# Patient Record
Sex: FEMALE | URBAN - NONMETROPOLITAN AREA
[De-identification: names, ages, dates, MRNs, and addresses within clinical notes are randomized per-mention and may not be internally consistent; named-entity substitution may affect disease eponyms.]

---

## 2022-01-04 ENCOUNTER — APPOINTMENT (RX ONLY)
Dept: URBAN - METROPOLITAN AREA CLINIC 16 | Facility: CLINIC | Age: 47
Setting detail: DERMATOLOGY
End: 2022-01-04

## 2022-01-04 VITALS — WEIGHT: 134 LBS | HEIGHT: 62 IN

## 2022-01-04 DIAGNOSIS — L81.4 OTHER MELANIN HYPERPIGMENTATION: ICD-10-CM | Status: STABLE

## 2022-01-04 DIAGNOSIS — D22 MELANOCYTIC NEVI: ICD-10-CM | Status: STABLE

## 2022-01-04 DIAGNOSIS — L73.8 OTHER SPECIFIED FOLLICULAR DISORDERS: ICD-10-CM | Status: STABLE

## 2022-01-04 DIAGNOSIS — D18.0 HEMANGIOMA: ICD-10-CM | Status: STABLE

## 2022-01-04 DIAGNOSIS — L82.1 OTHER SEBORRHEIC KERATOSIS: ICD-10-CM | Status: STABLE

## 2022-01-04 PROBLEM — D18.01 HEMANGIOMA OF SKIN AND SUBCUTANEOUS TISSUE: Status: ACTIVE | Noted: 2022-01-04

## 2022-01-04 PROBLEM — D22.0 MELANOCYTIC NEVI OF LIP: Status: ACTIVE | Noted: 2022-01-04

## 2022-01-04 PROBLEM — D48.5 NEOPLASM OF UNCERTAIN BEHAVIOR OF SKIN: Status: ACTIVE | Noted: 2022-01-04

## 2022-01-04 PROBLEM — D22.39 MELANOCYTIC NEVI OF OTHER PARTS OF FACE: Status: ACTIVE | Noted: 2022-01-04

## 2022-01-04 PROBLEM — D23.72 OTHER BENIGN NEOPLASM OF SKIN OF LEFT LOWER LIMB, INCLUDING HIP: Status: ACTIVE | Noted: 2022-01-04

## 2022-01-04 PROCEDURE — ? TREATMENT REGIMEN

## 2022-01-04 PROCEDURE — ? BIOPSY BY SHAVE METHOD

## 2022-01-04 PROCEDURE — 99203 OFFICE O/P NEW LOW 30 MIN: CPT | Mod: 25

## 2022-01-04 PROCEDURE — ? COUNSELING

## 2022-01-04 PROCEDURE — 11102 TANGNTL BX SKIN SINGLE LES: CPT

## 2022-01-04 PROCEDURE — ? EDUCATIONAL RESOURCES PROVIDED

## 2022-01-04 ASSESSMENT — LOCATION DETAILED DESCRIPTION DERM
LOCATION DETAILED: LEFT UPPER CUTANEOUS LIP
LOCATION DETAILED: LEFT INFERIOR MEDIAL MALAR CHEEK
LOCATION DETAILED: RIGHT SUPERIOR LATERAL UPPER BACK
LOCATION DETAILED: RIGHT MEDIAL MALAR CHEEK
LOCATION DETAILED: LEFT CENTRAL MALAR CHEEK
LOCATION DETAILED: RIGHT PROXIMAL CALF
LOCATION DETAILED: SUPERIOR THORACIC SPINE
LOCATION DETAILED: LEFT POSTERIOR SHOULDER
LOCATION DETAILED: LEFT INFERIOR LATERAL MALAR CHEEK

## 2022-01-04 ASSESSMENT — LOCATION SIMPLE DESCRIPTION DERM
LOCATION SIMPLE: RIGHT BACK
LOCATION SIMPLE: RIGHT CALF
LOCATION SIMPLE: RIGHT CHEEK
LOCATION SIMPLE: LEFT LIP
LOCATION SIMPLE: LEFT SHOULDER
LOCATION SIMPLE: UPPER BACK
LOCATION SIMPLE: LEFT CHEEK

## 2022-01-04 ASSESSMENT — LOCATION ZONE DERM
LOCATION ZONE: ARM
LOCATION ZONE: FACE
LOCATION ZONE: TRUNK
LOCATION ZONE: LEG
LOCATION ZONE: LIP

## 2022-01-04 NOTE — PROCEDURE: MIPS QUALITY
Quality 265: Biopsy Follow-Up: Biopsy results reviewed, communicated, tracked, and documented
Quality 110: Preventive Care And Screening: Influenza Immunization: Influenza immunization was not ordered or administered, reason not given
Quality 402: Tobacco Use And Help With Quitting Among Adolescents: Patient screened for tobacco and never smoked
Quality 130: Documentation Of Current Medications In The Medical Record: Current Medications Documented
Detail Level: Detailed
Quality 111:Pneumonia Vaccination Status For Older Adults: Pneumococcal Vaccination not Administered or Previously Received, Reason not Otherwise Specified
Quality 431: Preventive Care And Screening: Unhealthy Alcohol Use - Screening: Patient not identified as an unhealthy alcohol user when screened for unhealthy alcohol use using a systematic screening method
Quality 128: Preventive Care And Screening: Body Mass Index (Bmi) Screening And Follow-Up Plan: BMI is documented within normal parameters and no follow-up plan is required.

## 2022-01-04 NOTE — PROCEDURE: BIOPSY BY SHAVE METHOD
Validate That Anesthesia Is Not 0: No
Electrodesiccation Text: The wound bed was treated with electrodesiccation after the biopsy was performed.
Anesthesia Volume In Cc: 0.5
Detail Level: Detailed
Hemostasis: Electrocautery
Notification Instructions: Patient will be notified of biopsy results. However, patient instructed to call the office if not contacted within 2 weeks.
Consent: Written consent was obtained and risks were reviewed including but not limited to scarring, infection, bleeding, scabbing, incomplete removal, nerve damage and allergy to anesthesia.
Type Of Destruction Used: Curettage
Post-Care Instructions: I reviewed with the patient in detail post-care instructions. Patient is to keep the biopsy site dry overnight, and then apply bacitracin twice daily until healed. Patient may apply hydrogen peroxide soaks to remove any crusting.
Depth Of Biopsy: dermis
Wound Care: Petrolatum
Was A Bandage Applied: Yes
X Size Of Lesion In Cm: 0
Biopsy Type: H and E
Information: Selecting Yes will display possible errors in your note based on the variables you have selected. This validation is only offered as a suggestion for you. PLEASE NOTE THAT THE VALIDATION TEXT WILL BE REMOVED WHEN YOU FINALIZE YOUR NOTE. IF YOU WANT TO FAX A PRELIMINARY NOTE YOU WILL NEED TO TOGGLE THIS TO 'NO' IF YOU DO NOT WANT IT IN YOUR FAXED NOTE.
Cryotherapy Text: The wound bed was treated with cryotherapy after the biopsy was performed.
Dressing: bandage
Biopsy Method: Dermablade
Silver Nitrate Text: The wound bed was treated with silver nitrate after the biopsy was performed.
Curettage Text: The wound bed was treated with curettage after the biopsy was performed.
Anesthesia Type: 1% lidocaine with epinephrine
Electrodesiccation And Curettage Text: The wound bed was treated with electrodesiccation and curettage after the biopsy was performed.
Billing Type: Third-Party Bill

## 2022-01-15 ENCOUNTER — APPOINTMENT (RX ONLY)
Dept: URBAN - METROPOLITAN AREA CLINIC 16 | Facility: CLINIC | Age: 47
Setting detail: DERMATOLOGY
End: 2022-01-15

## 2022-01-15 VITALS — HEIGHT: 62 IN | WEIGHT: 131 LBS

## 2022-01-15 DIAGNOSIS — D22 MELANOCYTIC NEVI: ICD-10-CM

## 2022-01-15 PROBLEM — D22.71 MELANOCYTIC NEVI OF RIGHT LOWER LIMB, INCLUDING HIP: Status: ACTIVE | Noted: 2022-01-15

## 2022-01-15 PROCEDURE — ? EXCISION

## 2022-01-15 PROCEDURE — 11403 EXC TR-EXT B9+MARG 2.1-3CM: CPT

## 2022-01-15 PROCEDURE — 13121 CMPLX RPR S/A/L 2.6-7.5 CM: CPT

## 2022-01-15 ASSESSMENT — LOCATION DETAILED DESCRIPTION DERM: LOCATION DETAILED: RIGHT PROXIMAL CALF

## 2022-01-15 ASSESSMENT — LOCATION ZONE DERM: LOCATION ZONE: LEG

## 2022-01-15 ASSESSMENT — LOCATION SIMPLE DESCRIPTION DERM: LOCATION SIMPLE: RIGHT CALF

## 2022-01-15 NOTE — PROCEDURE: MIPS QUALITY
Quality 111:Pneumonia Vaccination Status For Older Adults: Pneumococcal Vaccination not Administered or Previously Received, Reason not Otherwise Specified
Quality 265: Biopsy Follow-Up: Biopsy results reviewed, communicated, tracked, and documented
Quality 431: Preventive Care And Screening: Unhealthy Alcohol Use - Screening: Patient not identified as an unhealthy alcohol user when screened for unhealthy alcohol use using a systematic screening method
Quality 128: Preventive Care And Screening: Body Mass Index (Bmi) Screening And Follow-Up Plan: BMI is documented within normal parameters and no follow-up plan is required.
Quality 130: Documentation Of Current Medications In The Medical Record: Current Medications Documented
Quality 402: Tobacco Use And Help With Quitting Among Adolescents: Patient screened for tobacco and never smoked
Detail Level: Detailed
Quality 110: Preventive Care And Screening: Influenza Immunization: Influenza immunization was not ordered or administered, reason not given

## 2022-01-15 NOTE — PROCEDURE: EXCISION
Show Accession Variable: Yes
Lazy S Intermediate Repair Preamble Text (Leave Blank If You Do Not Want): Undermining was performed with blunt dissection.
Purse String (Intermediate) Text: Given the location of the defect and the characteristics of the surrounding skin a pursestring intermediate closure was deemed most appropriate.  Undermining was performed circumfirentially around the surgical defect.  A purstring suture was then placed and tightened.
O-L Flap Text: The defect edges were debeveled with a #15 scalpel blade.  Given the location of the defect, shape of the defect and the proximity to free margins an O-L flap was deemed most appropriate.  Using a sterile surgical marker, an appropriate advancement flap was drawn incorporating the defect and placing the expected incisions within the relaxed skin tension lines where possible.    The area thus outlined was incised deep to adipose tissue with a #15 scalpel blade.  The skin margins were undermined to an appropriate distance in all directions utilizing iris scissors.
Hemigard Intro: Due to skin fragility and wound tension, it was decided to use HEMIGARD adhesive retention suture devices to permit a linear closure. The skin was cleaned and dried for a 6cm distance away from the wound. Excessive hair, if present, was removed to allow for adhesion.
Ear Star Wedge Flap Text: The defect edges were debeveled with a #15 blade scalpel.  Given the location of the defect and the proximity to free margins (helical rim) an ear star wedge flap was deemed most appropriate.  Using a sterile surgical marker, the appropriate flap was drawn incorporating the defect and placing the expected incisions between the helical rim and antihelix where possible.  The area thus outlined was incised through and through with a #15 scalpel blade.
Cheek Interpolation Flap Text: A decision was made to reconstruct the defect utilizing an interpolation axial flap and a staged reconstruction.  A telfa template was made of the defect.  This telfa template was then used to outline the Cheek Interpolation flap.  The donor area for the pedicle flap was then injected with anesthesia.  The flap was excised through the skin and subcutaneous tissue down to the layer of the underlying musculature.  The interpolation flap was carefully excised within this deep plane to maintain its blood supply.  The edges of the donor site were undermined.   The donor site was closed in a primary fashion.  The pedicle was then rotated into position and sutured.  Once the tube was sutured into place, adequate blood supply was confirmed with blanching and refill.  The pedicle was then wrapped with xeroform gauze and dressed appropriately with a telfa and gauze bandage to ensure continued blood supply and protect the attached pedicle.
Skin Substitute Units (Will Override Primary Defect Units If Greater Than 0): 0
Complex Repair And Burow's Graft Text: The defect edges were debeveled with a #15 scalpel blade.  The primary defect was closed partially with a complex linear closure.  Given the location of the defect, shape of the defect, the proximity to free margins and the presence of a standing cone deformity a Burow's graft was deemed most appropriate to repair the remaining defect.  The graft was trimmed to fit the size of the remaining defect.  The graft was then placed in the primary defect, oriented appropriately, and sutured into place.
Cartilage Graft Text: The defect edges were debeveled with a #15 scalpel blade.  Given the location of the defect, shape of the defect, the fact the defect involved a full thickness cartilage defect a cartilage graft was deemed most appropriate.  An appropriate donor site was identified, cleansed, and anesthetized. The cartilage graft was then harvested and transferred to the recipient site, oriented appropriately and then sutured into place.  The secondary defect was then repaired using a primary closure.
Anesthesia Type: 1% lidocaine with epinephrine
Complex Repair Preamble Text (Leave Blank If You Do Not Want): Extensive wide undermining was performed.
Double O-Z Plasty Text: The defect edges were debeveled with a #15 scalpel blade.  Given the location of the defect, shape of the defect and the proximity to free margins a Double O-Z plasty (double transposition flap) was deemed most appropriate.  Using a sterile surgical marker, the appropriate transposition flaps were drawn incorporating the defect and placing the expected incisions within the relaxed skin tension lines where possible. The area thus outlined was incised deep to adipose tissue with a #15 scalpel blade.  The skin margins were undermined to an appropriate distance in all directions utilizing iris scissors.  Hemostasis was achieved with electrocautery.  The flaps were then transposed into place, one clockwise and the other counterclockwise, and anchored with interrupted buried subcutaneous sutures.
Transposition Flap Text: The defect edges were debeveled with a #15 scalpel blade.  Given the location of the defect and the proximity to free margins a transposition flap was deemed most appropriate.  Using a sterile surgical marker, an appropriate transposition flap was drawn incorporating the defect.    The area thus outlined was incised deep to adipose tissue with a #15 scalpel blade.  The skin margins were undermined to an appropriate distance in all directions utilizing iris scissors.
Anesthesia Volume In Cc: 3
Complex Requirements: Retention Sutures?: No
Excision Method: Elliptical
Detail Level: Detailed
Excisional Biopsy Additional Text (Leave Blank If You Do Not Want): The margin was drawn around the clinically apparent lesion. An elliptical shape was then drawn on the skin incorporating the lesion and margins.  Incisions were then made along these lines to the appropriate tissue plane and the lesion was extirpated.
Complex Repair And O-L Flap Text: The defect edges were debeveled with a #15 scalpel blade.  The primary defect was closed partially with a complex linear closure.  Given the location of the remaining defect, shape of the defect and the proximity to free margins an O-L flap was deemed most appropriate for complete closure of the defect.  Using a sterile surgical marker, an appropriate flap was drawn incorporating the defect and placing the expected incisions within the relaxed skin tension lines where possible.    The area thus outlined was incised deep to adipose tissue with a #15 scalpel blade.  The skin margins were undermined to an appropriate distance in all directions utilizing iris scissors.
Mucosal Advancement Flap Text: Given the location of the defect, shape of the defect and the proximity to free margins a mucosal advancement flap was deemed most appropriate. Incisions were made with a 15 blade scalpel in the appropriate fashion along the cutaneous vermillion border and the mucosal lip. The remaining actinically damaged mucosal tissue was excised.  The mucosal advancement flap was then elevated to the gingival sulcus with care taken to preserve the neurovascular structures and advanced into the primary defect. Care was taken to ensure that precise realignment of the vermilion border was achieved.
Island Pedicle Flap With Canthal Suspension Text: The defect edges were debeveled with a #15 scalpel blade.  Given the location of the defect, shape of the defect and the proximity to free margins an island pedicle advancement flap was deemed most appropriate.  Using a sterile surgical marker, an appropriate advancement flap was drawn incorporating the defect, outlining the appropriate donor tissue and placing the expected incisions within the relaxed skin tension lines where possible. The area thus outlined was incised deep to adipose tissue with a #15 scalpel blade.  The skin margins were undermined to an appropriate distance in all directions around the primary defect and laterally outward around the island pedicle utilizing iris scissors.  There was minimal undermining beneath the pedicle flap. A suspension suture was placed in the canthal tendon to prevent tension and prevent ectropion.
Lip Wedge Excision Repair Text: Given the location of the defect and the proximity to free margins a full thickness wedge repair was deemed most appropriate.  Using a sterile surgical marker, the appropriate repair was drawn incorporating the defect and placing the expected incisions perpendicular to the vermilion border.  The vermilion border was also meticulously outlined to ensure appropriate reapproximation during the repair.  The area thus outlined was incised through and through with a #15 scalpel blade.  The muscularis and dermis were reaproximated with deep sutures following hemostasis. Care was taken to realign the vermilion border before proceeding with the superficial closure.  Once the vermilion was realigned the superfical and mucosal closure was finished.
Complex Repair And O-T Advancement Flap Text: The defect edges were debeveled with a #15 scalpel blade.  The primary defect was closed partially with a complex linear closure.  Given the location of the remaining defect, shape of the defect and the proximity to free margins an O-T advancement flap was deemed most appropriate for complete closure of the defect.  Using a sterile surgical marker, an appropriate advancement flap was drawn incorporating the defect and placing the expected incisions within the relaxed skin tension lines where possible.    The area thus outlined was incised deep to adipose tissue with a #15 scalpel blade.  The skin margins were undermined to an appropriate distance in all directions utilizing iris scissors.
Complex Repair And Skin Substitute Graft Text: The defect edges were debeveled with a #15 scalpel blade.  The primary defect was closed partially with a complex linear closure.  Given the location of the remaining defect, shape of the defect and the proximity to free margins a skin substitute graft was deemed most appropriate to repair the remaining defect.  The graft was trimmed to fit the size of the remaining defect.  The graft was then placed in the primary defect, oriented appropriately, and sutured into place.
Modified Advancement Flap Text: The defect edges were debeveled with a #15 scalpel blade.  Given the location of the defect, shape of the defect and the proximity to free margins a modified advancement flap was deemed most appropriate.  Using a sterile surgical marker, an appropriate advancement flap was drawn incorporating the defect and placing the expected incisions within the relaxed skin tension lines where possible.    The area thus outlined was incised deep to adipose tissue with a #15 scalpel blade.  The skin margins were undermined to an appropriate distance in all directions utilizing iris scissors.
Island Pedicle Flap Text: The defect edges were debeveled with a #15 scalpel blade.  Given the location of the defect, shape of the defect and the proximity to free margins an island pedicle advancement flap was deemed most appropriate.  Using a sterile surgical marker, an appropriate advancement flap was drawn incorporating the defect, outlining the appropriate donor tissue and placing the expected incisions within the relaxed skin tension lines where possible.    The area thus outlined was incised deep to adipose tissue with a #15 scalpel blade.  The skin margins were undermined to an appropriate distance in all directions around the primary defect and laterally outward around the island pedicle utilizing iris scissors.  There was minimal undermining beneath the pedicle flap.
Paramedian Forehead Flap Text: A decision was made to reconstruct the defect utilizing an interpolation axial flap and a staged reconstruction.  A telfa template was made of the defect.  This telfa template was then used to outline the paramedian forehead pedicle flap.  The donor area for the pedicle flap was then injected with anesthesia.  The flap was excised through the skin and subcutaneous tissue down to the layer of the underlying musculature.  The pedicle flap was carefully excised within this deep plane to maintain its blood supply.  The edges of the donor site were undermined.   The donor site was closed in a primary fashion.  The pedicle was then rotated into position and sutured.  Once the tube was sutured into place, adequate blood supply was confirmed with blanching and refill.  The pedicle was then wrapped with xeroform gauze and dressed appropriately with a telfa and gauze bandage to ensure continued blood supply and protect the attached pedicle.
Xenograft Text: The defect edges were debeveled with a #15 scalpel blade.  Given the location of the defect, shape of the defect and the proximity to free margins a xenograft was deemed most appropriate.  The graft was then trimmed to fit the size of the defect.  The graft was then placed in the primary defect and oriented appropriately.
Suturegard Body: The suture ends were repeatedly re-tightened and re-clamped to achieve the desired tissue expansion.
O-T Advancement Flap Text: The defect edges were debeveled with a #15 scalpel blade.  Given the location of the defect, shape of the defect and the proximity to free margins an O-T advancement flap was deemed most appropriate.  Using a sterile surgical marker, an appropriate advancement flap was drawn incorporating the defect and placing the expected incisions within the relaxed skin tension lines where possible.    The area thus outlined was incised deep to adipose tissue with a #15 scalpel blade.  The skin margins were undermined to an appropriate distance in all directions utilizing iris scissors.
Bilateral Helical Rim Advancement Flap Text: The defect edges were debeveled with a #15 blade scalpel.  Given the location of the defect and the proximity to free margins (helical rim) a bilateral helical rim advancement flap was deemed most appropriate.  Using a sterile surgical marker, the appropriate advancement flaps were drawn incorporating the defect and placing the expected incisions between the helical rim and antihelix where possible.  The area thus outlined was incised through and through with a #15 scalpel blade.  With a skin hook and iris scissors, the flaps were gently and sharply undermined and freed up.
Zygomaticofacial Flap Text: Given the location of the defect, shape of the defect and the proximity to free margins a zygomaticofacial flap was deemed most appropriate for repair.  Using a sterile surgical marker, the appropriate flap was drawn incorporating the defect and placing the expected incisions within the relaxed skin tension lines where possible. The area thus outlined was incised deep to adipose tissue with a #15 scalpel blade with preservation of a vascular pedicle.  The skin margins were undermined to an appropriate distance in all directions utilizing iris scissors.  The flap was then placed into the defect and anchored with interrupted buried subcutaneous sutures.
Advancement Flap (Single) Text: The defect edges were debeveled with a #15 scalpel blade.  Given the location of the defect and the proximity to free margins a single advancement flap was deemed most appropriate.  Using a sterile surgical marker, an appropriate advancement flap was drawn incorporating the defect and placing the expected incisions within the relaxed skin tension lines where possible.    The area thus outlined was incised deep to adipose tissue with a #15 scalpel blade.  The skin margins were undermined to an appropriate distance in all directions utilizing iris scissors.
Complex Repair And V-Y Plasty Text: The defect edges were debeveled with a #15 scalpel blade.  The primary defect was closed partially with a complex linear closure.  Given the location of the remaining defect, shape of the defect and the proximity to free margins a V-Y plasty was deemed most appropriate for complete closure of the defect.  Using a sterile surgical marker, an appropriate advancement flap was drawn incorporating the defect and placing the expected incisions within the relaxed skin tension lines where possible.    The area thus outlined was incised deep to adipose tissue with a #15 scalpel blade.  The skin margins were undermined to an appropriate distance in all directions utilizing iris scissors.
Orbicularis Oris Muscle Flap Text: The defect edges were debeveled with a #15 scalpel blade.  Given that the defect affected the competency of the oral sphincter an orbicularis oris muscle flap was deemed most appropriate to restore this competency and normal muscle function.  Using a sterile surgical marker, an appropriate flap was drawn incorporating the defect. The area thus outlined was incised with a #15 scalpel blade.
Size Of Margin In Cm: 0.5
O-Z Plasty Text: The defect edges were debeveled with a #15 scalpel blade.  Given the location of the defect, shape of the defect and the proximity to free margins an O-Z plasty (double transposition flap) was deemed most appropriate.  Using a sterile surgical marker, the appropriate transposition flaps were drawn incorporating the defect and placing the expected incisions within the relaxed skin tension lines where possible.    The area thus outlined was incised deep to adipose tissue with a #15 scalpel blade.  The skin margins were undermined to an appropriate distance in all directions utilizing iris scissors.  Hemostasis was achieved with electrocautery.  The flaps were then transposed into place, one clockwise and the other counterclockwise, and anchored with interrupted buried subcutaneous sutures.
Eye Clamp Note Details: An eye clamp was used during the procedure.
Star Wedge Flap Text: The defect edges were debeveled with a #15 scalpel blade.  Given the location of the defect, shape of the defect and the proximity to free margins a star wedge flap was deemed most appropriate.  Using a sterile surgical marker, an appropriate rotation flap was drawn incorporating the defect and placing the expected incisions within the relaxed skin tension lines where possible. The area thus outlined was incised deep to adipose tissue with a #15 scalpel blade.  The skin margins were undermined to an appropriate distance in all directions utilizing iris scissors.
Nostril Rim Text: The closure involved the nostril rim.
Graft Donor Site Bandage (Optional-Leave Blank If You Don't Want In Note): Steri-strips and a pressure bandage were applied to the donor site.
Intermediate / Complex Repair - Final Wound Length In Cm: 3.2
Slit Excision Additional Text (Leave Blank If You Do Not Want): A linear line was drawn on the skin overlying the lesion. An incision was made slowly until the lesion was visualized.  Once visualized, the lesion was removed with blunt dissection.
Vermilion Border Text: The closure involved the vermilion border.
Retention Suture Text: Retention sutures were placed to support the closure and prevent dehiscence.
Saucerization Depth: dermis and superficial adipose tissue
Excision Depth: adipose tissue
Saucerization Excision Additional Text (Leave Blank If You Do Not Want): The margin was drawn around the clinically apparent lesion.  Incisions were then made along these lines, in a tangential fashion, to the appropriate tissue plane and the lesion was extirpated.
Complex Repair And A-T Advancement Flap Text: The defect edges were debeveled with a #15 scalpel blade.  The primary defect was closed partially with a complex linear closure.  Given the location of the remaining defect, shape of the defect and the proximity to free margins an A-T advancement flap was deemed most appropriate for complete closure of the defect.  Using a sterile surgical marker, an appropriate advancement flap was drawn incorporating the defect and placing the expected incisions within the relaxed skin tension lines where possible.    The area thus outlined was incised deep to adipose tissue with a #15 scalpel blade.  The skin margins were undermined to an appropriate distance in all directions utilizing iris scissors.
Mercedes Flap Text: The defect edges were debeveled with a #15 scalpel blade.  Given the location of the defect, shape of the defect and the proximity to free margins a Mercedes flap was deemed most appropriate.  Using a sterile surgical marker, an appropriate advancement flap was drawn incorporating the defect and placing the expected incisions within the relaxed skin tension lines where possible. The area thus outlined was incised deep to adipose tissue with a #15 scalpel blade.  The skin margins were undermined to an appropriate distance in all directions utilizing iris scissors.
Complex Repair And Xenograft Text: The defect edges were debeveled with a #15 scalpel blade.  The primary defect was closed partially with a complex linear closure.  Given the location of the defect, shape of the defect and the proximity to free margins a xenograft was deemed most appropriate to repair the remaining defect.  The graft was trimmed to fit the size of the remaining defect.  The graft was then placed in the primary defect, oriented appropriately, and sutured into place.
Undermining Type: Entire Wound
Dorsal Nasal Flap Text: The defect edges were debeveled with a #15 scalpel blade.  Given the location of the defect and the proximity to free margins a dorsal nasal flap was deemed most appropriate.  Using a sterile surgical marker, an appropriate dorsal nasal flap was drawn around the defect.    The area thus outlined was incised deep to adipose tissue with a #15 scalpel blade.  The skin margins were undermined to an appropriate distance in all directions utilizing iris scissors.
Posterior Auricular Interpolation Flap Text: A decision was made to reconstruct the defect utilizing an interpolation axial flap and a staged reconstruction.  A telfa template was made of the defect.  This telfa template was then used to outline the posterior auricular interpolation flap.  The donor area for the pedicle flap was then injected with anesthesia.  The flap was excised through the skin and subcutaneous tissue down to the layer of the underlying musculature.  The pedicle flap was carefully excised within this deep plane to maintain its blood supply.  The edges of the donor site were undermined.   The donor site was closed in a primary fashion.  The pedicle was then rotated into position and sutured.  Once the tube was sutured into place, adequate blood supply was confirmed with blanching and refill.  The pedicle was then wrapped with xeroform gauze and dressed appropriately with a telfa and gauze bandage to ensure continued blood supply and protect the attached pedicle.
Complex Repair And Ftsg Text: The defect edges were debeveled with a #15 scalpel blade.  The primary defect was closed partially with a complex linear closure.  Given the location of the defect, shape of the defect and the proximity to free margins a full thickness skin graft was deemed most appropriate to repair the remaining defect.  The graft was trimmed to fit the size of the remaining defect.  The graft was then placed in the primary defect, oriented appropriately, and sutured into place.
Positioning (Leave Blank If You Do Not Want): The patient was placed in a comfortable position exposing the surgical site.
Adjacent Tissue Transfer Text: The defect edges were debeveled with a #15 scalpel blade.  Given the location of the defect and the proximity to free margins an adjacent tissue transfer was deemed most appropriate.  Using a sterile surgical marker, an appropriate flap was drawn incorporating the defect and placing the expected incisions within the relaxed skin tension lines where possible.    The area thus outlined was incised deep to adipose tissue with a #15 scalpel blade.  The skin margins were undermined to an appropriate distance in all directions utilizing iris scissors.
Nasalis-Muscle-Based Myocutaneous Island Pedicle Flap Text: Using a #15 blade, an incision was made around the donor flap to the level of the nasalis muscle. Wide lateral undermining was then performed in both the subcutaneous plane above the nasalis muscle, and in a submuscular plane just above periosteum. This allowed the formation of a free nasalis muscle axial pedicle (based on the angular artery) which was still attached to the actual cutaneous flap, increasing its mobility and vascular viability. Hemostasis was obtained with pinpoint electrocoagulation. The flap was mobilized into position and the pivotal anchor points positioned and stabilized with buried interrupted sutures. Subcutaneous and dermal tissues were closed in a multilayered fashion with sutures. Tissue redundancies were excised, and the epidermal edges were apposed without significant tension and sutured with sutures.
Complex Repair And Transposition Flap Text: The defect edges were debeveled with a #15 scalpel blade.  The primary defect was closed partially with a complex linear closure.  Given the location of the remaining defect, shape of the defect and the proximity to free margins a transposition flap was deemed most appropriate for complete closure of the defect.  Using a sterile surgical marker, an appropriate advancement flap was drawn incorporating the defect and placing the expected incisions within the relaxed skin tension lines where possible.    The area thus outlined was incised deep to adipose tissue with a #15 scalpel blade.  The skin margins were undermined to an appropriate distance in all directions utilizing iris scissors.
Debridement Text: The wound edges were debrided prior to proceeding with the closure to facilitate wound healing.
Staged Advancement Flap Text: The defect edges were debeveled with a #15 scalpel blade.  Given the location of the defect, shape of the defect and the proximity to free margins a staged advancement flap was deemed most appropriate.  Using a sterile surgical marker, an appropriate advancement flap was drawn incorporating the defect and placing the expected incisions within the relaxed skin tension lines where possible. The area thus outlined was incised deep to adipose tissue with a #15 scalpel blade.  The skin margins were undermined to an appropriate distance in all directions utilizing iris scissors.
O-T Plasty Text: The defect edges were debeveled with a #15 scalpel blade.  Given the location of the defect, shape of the defect and the proximity to free margins an O-T plasty was deemed most appropriate.  Using a sterile surgical marker, an appropriate O-T plasty was drawn incorporating the defect and placing the expected incisions within the relaxed skin tension lines where possible.    The area thus outlined was incised deep to adipose tissue with a #15 scalpel blade.  The skin margins were undermined to an appropriate distance in all directions utilizing iris scissors.
X Size Of Lesion In Cm (Optional): 0.6
Complex Repair And Rhombic Flap Text: The defect edges were debeveled with a #15 scalpel blade.  The primary defect was closed partially with a complex linear closure.  Given the location of the remaining defect, shape of the defect and the proximity to free margins a rhombic flap was deemed most appropriate for complete closure of the defect.  Using a sterile surgical marker, an appropriate advancement flap was drawn incorporating the defect and placing the expected incisions within the relaxed skin tension lines where possible.    The area thus outlined was incised deep to adipose tissue with a #15 scalpel blade.  The skin margins were undermined to an appropriate distance in all directions utilizing iris scissors.
Keystone Flap Text: The defect edges were debeveled with a #15 scalpel blade.  Given the location of the defect, shape of the defect a keystone flap was deemed most appropriate.  Using a sterile surgical marker, an appropriate keystone flap was drawn incorporating the defect, outlining the appropriate donor tissue and placing the expected incisions within the relaxed skin tension lines where possible. The area thus outlined was incised deep to adipose tissue with a #15 scalpel blade.  The skin margins were undermined to an appropriate distance in all directions around the primary defect and laterally outward around the flap utilizing iris scissors.
Spiral Flap Text: The defect edges were debeveled with a #15 scalpel blade.  Given the location of the defect, shape of the defect and the proximity to free margins a spiral flap was deemed most appropriate.  Using a sterile surgical marker, an appropriate rotation flap was drawn incorporating the defect and placing the expected incisions within the relaxed skin tension lines where possible. The area thus outlined was incised deep to adipose tissue with a #15 scalpel blade.  The skin margins were undermined to an appropriate distance in all directions utilizing iris scissors.
Wound Care: Vaseline
Size Of Lesion In Cm: 1.1
Helical Rim Text: The closure involved the helical rim.
Eliptical Excision Additional Text (Leave Blank If You Do Not Want): The margin was drawn around the clinically apparent lesion.  An elliptical shape was then drawn on the skin incorporating the lesion and margins.  Incisions were then made along these lines to the appropriate tissue plane and the lesion was extirpated.
Complex Repair And Modified Advancement Flap Text: The defect edges were debeveled with a #15 scalpel blade.  The primary defect was closed partially with a complex linear closure.  Given the location of the remaining defect, shape of the defect and the proximity to free margins a modified advancement flap was deemed most appropriate for complete closure of the defect.  Using a sterile surgical marker, an appropriate advancement flap was drawn incorporating the defect and placing the expected incisions within the relaxed skin tension lines where possible.    The area thus outlined was incised deep to adipose tissue with a #15 scalpel blade.  The skin margins were undermined to an appropriate distance in all directions utilizing iris scissors.
Advancement-Rotation Flap Text: The defect edges were debeveled with a #15 scalpel blade.  Given the location of the defect, shape of the defect and the proximity to free margins an advancement-rotation flap was deemed most appropriate.  Using a sterile surgical marker, an appropriate flap was drawn incorporating the defect and placing the expected incisions within the relaxed skin tension lines where possible. The area thus outlined was incised deep to adipose tissue with a #15 scalpel blade.  The skin margins were undermined to an appropriate distance in all directions utilizing iris scissors.
Trilobed Flap Text: The defect edges were debeveled with a #15 scalpel blade.  Given the location of the defect and the proximity to free margins a trilobed flap was deemed most appropriate.  Using a sterile surgical marker, an appropriate trilobed flap drawn around the defect.    The area thus outlined was incised deep to adipose tissue with a #15 scalpel blade.  The skin margins were undermined to an appropriate distance in all directions utilizing iris scissors.
Complex Repair And Tissue Cultured Epidermal Autograft Text: The defect edges were debeveled with a #15 scalpel blade.  The primary defect was closed partially with a complex linear closure.  Given the location of the defect, shape of the defect and the proximity to free margins an tissue cultured epidermal autograft was deemed most appropriate to repair the remaining defect.  The graft was trimmed to fit the size of the remaining defect.  The graft was then placed in the primary defect, oriented appropriately, and sutured into place.
Tissue Cultured Epidermal Autograft Text: The defect edges were debeveled with a #15 scalpel blade.  Given the location of the defect, shape of the defect and the proximity to free margins a tissue cultured epidermal autograft was deemed most appropriate.  The graft was then trimmed to fit the size of the defect.  The graft was then placed in the primary defect and oriented appropriately.
A-T Advancement Flap Text: The defect edges were debeveled with a #15 scalpel blade.  Given the location of the defect, shape of the defect and the proximity to free margins an A-T advancement flap was deemed most appropriate.  Using a sterile surgical marker, an appropriate advancement flap was drawn incorporating the defect and placing the expected incisions within the relaxed skin tension lines where possible.    The area thus outlined was incised deep to adipose tissue with a #15 scalpel blade.  The skin margins were undermined to an appropriate distance in all directions utilizing iris scissors.
Helical Rim Advancement Flap Text: The defect edges were debeveled with a #15 blade scalpel.  Given the location of the defect and the proximity to free margins (helical rim) a double helical rim advancement flap was deemed most appropriate.  Using a sterile surgical marker, the appropriate advancement flaps were drawn incorporating the defect and placing the expected incisions between the helical rim and antihelix where possible.  The area thus outlined was incised through and through with a #15 scalpel blade.  With a skin hook and iris scissors, the flaps were gently and sharply undermined and freed up.
Suturegard Intro: Intraoperative tissue expansion was performed, utilizing the SUTUREGARD device, in order to reduce wound tension.
Z Plasty Text: The lesion was extirpated to the level of the fat with a #15 scalpel blade.  Given the location of the defect, shape of the defect and the proximity to free margins a Z-plasty was deemed most appropriate for repair.  Using a sterile surgical marker, the appropriate transposition arms of the Z-plasty were drawn incorporating the defect and placing the expected incisions within the relaxed skin tension lines where possible.    The area thus outlined was incised deep to adipose tissue with a #15 scalpel blade.  The skin margins were undermined to an appropriate distance in all directions utilizing iris scissors.  The opposing transposition arms were then transposed into place in opposite direction and anchored with interrupted buried subcutaneous sutures.
Complex Repair And Dorsal Nasal Flap Text: The defect edges were debeveled with a #15 scalpel blade.  The primary defect was closed partially with a complex linear closure.  Given the location of the remaining defect, shape of the defect and the proximity to free margins a dorsal nasal flap was deemed most appropriate for complete closure of the defect.  Using a sterile surgical marker, an appropriate flap was drawn incorporating the defect and placing the expected incisions within the relaxed skin tension lines where possible.    The area thus outlined was incised deep to adipose tissue with a #15 scalpel blade.  The skin margins were undermined to an appropriate distance in all directions utilizing iris scissors.
Hemostasis: Biterminal bipolar electrocoagulation
No Repair - Repaired With Adjacent Surgical Defect Text (Leave Blank If You Do Not Want): After the excision the defect was repaired concurrently with another surgical defect which was in close approximation.
Medical Necessity Information: It is in your best interest to select a reason for this procedure from the list below. All of these items fulfill various CMS LCD requirements except lesion extends to a margin.
Home Suture Removal Text: Patient was provided a home suture removal kit and will remove their sutures at home.  If they have any questions or difficulties they will call the office.
Retention Suture Bite Size: 3 mm
Repair Performed By Another Provider Text (Leave Blank If You Do Not Want): After the tissue was excised the defect was repaired by another provider.
Complex Repair And Rotation Flap Text: The defect edges were debeveled with a #15 scalpel blade.  The primary defect was closed partially with a complex linear closure.  Given the location of the remaining defect, shape of the defect and the proximity to free margins a rotation flap was deemed most appropriate for complete closure of the defect.  Using a sterile surgical marker, an appropriate advancement flap was drawn incorporating the defect and placing the expected incisions within the relaxed skin tension lines where possible.    The area thus outlined was incised deep to adipose tissue with a #15 scalpel blade.  The skin margins were undermined to an appropriate distance in all directions utilizing iris scissors.
Rotation Flap Text: The defect edges were debeveled with a #15 scalpel blade.  Given the location of the defect, shape of the defect and the proximity to free margins a rotation flap was deemed most appropriate.  Using a sterile surgical marker, an appropriate rotation flap was drawn incorporating the defect and placing the expected incisions within the relaxed skin tension lines where possible.    The area thus outlined was incised deep to adipose tissue with a #15 scalpel blade.  The skin margins were undermined to an appropriate distance in all directions utilizing iris scissors.
Suturegard Retention Suture: 2-0 Nylon
Island Pedicle Flap-Requiring Vessel Identification Text: The defect edges were debeveled with a #15 scalpel blade.  Given the location of the defect, shape of the defect and the proximity to free margins an island pedicle advancement flap was deemed most appropriate.  Using a sterile surgical marker, an appropriate advancement flap was drawn, based on the axial vessel mentioned above, incorporating the defect, outlining the appropriate donor tissue and placing the expected incisions within the relaxed skin tension lines where possible.    The area thus outlined was incised deep to adipose tissue with a #15 scalpel blade.  The skin margins were undermined to an appropriate distance in all directions around the primary defect and laterally outward around the island pedicle utilizing iris scissors.  There was minimal undermining beneath the pedicle flap.
Burow's Graft Text: The defect edges were debeveled with a #15 scalpel blade.  Given the location of the defect, shape of the defect, the proximity to free margins and the presence of a standing cone deformity a Burow's skin graft was deemed most appropriate. The standing cone was removed and this tissue was then trimmed to the shape of the primary defect. The adipose tissue was also removed until only dermis and epidermis were left.  The skin margins of the secondary defect were undermined to an appropriate distance in all directions utilizing iris scissors.  The secondary defect was closed with interrupted buried subcutaneous sutures.  The skin edges were then re-apposed with running  sutures.  The skin graft was then placed in the primary defect and oriented appropriately.
Deep Sutures: 3-0 Vicryl
Mastoid Interpolation Flap Text: A decision was made to reconstruct the defect utilizing an interpolation axial flap and a staged reconstruction.  A telfa template was made of the defect.  This telfa template was then used to outline the mastoid interpolation flap.  The donor area for the pedicle flap was then injected with anesthesia.  The flap was excised through the skin and subcutaneous tissue down to the layer of the underlying musculature.  The pedicle flap was carefully excised within this deep plane to maintain its blood supply.  The edges of the donor site were undermined.   The donor site was closed in a primary fashion.  The pedicle was then rotated into position and sutured.  Once the tube was sutured into place, adequate blood supply was confirmed with blanching and refill.  The pedicle was then wrapped with xeroform gauze and dressed appropriately with a telfa and gauze bandage to ensure continued blood supply and protect the attached pedicle.
Distance Of Undermining In Cm (Required): 1.6
Skin Substitute Text: The defect edges were debeveled with a #15 scalpel blade.  Given the location of the defect, shape of the defect and the proximity to free margins a skin substitute graft was deemed most appropriate.  The graft material was trimmed to fit the size of the defect. The graft was then placed in the primary defect and oriented appropriately.
Crescentic Advancement Flap Text: The defect edges were debeveled with a #15 scalpel blade.  Given the location of the defect and the proximity to free margins a crescentic advancement flap was deemed most appropriate.  Using a sterile surgical marker, the appropriate advancement flap was drawn incorporating the defect and placing the expected incisions within the relaxed skin tension lines where possible.    The area thus outlined was incised deep to adipose tissue with a #15 scalpel blade.  The skin margins were undermined to an appropriate distance in all directions utilizing iris scissors.
Bi-Rhombic Flap Text: The defect edges were debeveled with a #15 scalpel blade.  Given the location of the defect and the proximity to free margins a bi-rhombic flap was deemed most appropriate.  Using a sterile surgical marker, an appropriate rhombic flap was drawn incorporating the defect. The area thus outlined was incised deep to adipose tissue with a #15 scalpel blade.  The skin margins were undermined to an appropriate distance in all directions utilizing iris scissors.
Consent was obtained from the patient. The risks and benefits to therapy were discussed in detail. Specifically, the risks of infection, scarring, bleeding, prolonged wound healing, incomplete removal, allergy to anesthesia, nerve injury and recurrence were addressed. Prior to the procedure, the treatment site was clearly identified and confirmed by the patient. All components of Universal Protocol/PAUSE Rule completed.
Complex Repair And Z Plasty Text: The defect edges were debeveled with a #15 scalpel blade.  The primary defect was closed partially with a complex linear closure.  Given the location of the remaining defect, shape of the defect and the proximity to free margins a Z plasty was deemed most appropriate for complete closure of the defect.  Using a sterile surgical marker, an appropriate advancement flap was drawn incorporating the defect and placing the expected incisions within the relaxed skin tension lines where possible.    The area thus outlined was incised deep to adipose tissue with a #15 scalpel blade.  The skin margins were undermined to an appropriate distance in all directions utilizing iris scissors.
W Plasty Text: The lesion was extirpated to the level of the fat with a #15 scalpel blade.  Given the location of the defect, shape of the defect and the proximity to free margins a W-plasty was deemed most appropriate for repair.  Using a sterile surgical marker, the appropriate transposition arms of the W-plasty were drawn incorporating the defect and placing the expected incisions within the relaxed skin tension lines where possible.    The area thus outlined was incised deep to adipose tissue with a #15 scalpel blade.  The skin margins were undermined to an appropriate distance in all directions utilizing iris scissors.  The opposing transposition arms were then transposed into place in opposite direction and anchored with interrupted buried subcutaneous sutures.
Complex Repair And W Plasty Text: The defect edges were debeveled with a #15 scalpel blade.  The primary defect was closed partially with a complex linear closure.  Given the location of the remaining defect, shape of the defect and the proximity to free margins a W plasty was deemed most appropriate for complete closure of the defect.  Using a sterile surgical marker, an appropriate advancement flap was drawn incorporating the defect and placing the expected incisions within the relaxed skin tension lines where possible.    The area thus outlined was incised deep to adipose tissue with a #15 scalpel blade.  The skin margins were undermined to an appropriate distance in all directions utilizing iris scissors.
H Plasty Text: Given the location of the defect, shape of the defect and the proximity to free margins a H-plasty was deemed most appropriate for repair.  Using a sterile surgical marker, the appropriate advancement arms of the H-plasty were drawn incorporating the defect and placing the expected incisions within the relaxed skin tension lines where possible. The area thus outlined was incised deep to adipose tissue with a #15 scalpel blade. The skin margins were undermined to an appropriate distance in all directions utilizing iris scissors.  The opposing advancement arms were then advanced into place in opposite direction and anchored with interrupted buried subcutaneous sutures.
Post-Care Instructions: I reviewed with the patient in detail post-care instructions. Patient is not to engage in any heavy lifting, exercise, or swimming for the next 14 days. Should the patient develop any fevers, chills, bleeding, severe pain patient will contact the office immediately. Post care instructions handout given to the patient.
Complex Repair And Melolabial Flap Text: The defect edges were debeveled with a #15 scalpel blade.  The primary defect was closed partially with a complex linear closure.  Given the location of the remaining defect, shape of the defect and the proximity to free margins a melolabial flap was deemed most appropriate for complete closure of the defect.  Using a sterile surgical marker, an appropriate advancement flap was drawn incorporating the defect and placing the expected incisions within the relaxed skin tension lines where possible.    The area thus outlined was incised deep to adipose tissue with a #15 scalpel blade.  The skin margins were undermined to an appropriate distance in all directions utilizing iris scissors.
Estimated Blood Loss (Cc): minimal
Hatchet Flap Text: The defect edges were debeveled with a #15 scalpel blade.  Given the location of the defect, shape of the defect and the proximity to free margins a hatchet flap was deemed most appropriate.  Using a sterile surgical marker, an appropriate hatchet flap was drawn incorporating the defect and placing the expected incisions within the relaxed skin tension lines where possible.    The area thus outlined was incised deep to adipose tissue with a #15 scalpel blade.  The skin margins were undermined to an appropriate distance in all directions utilizing iris scissors.
Fusiform Excision Additional Text (Leave Blank If You Do Not Want): The margin was drawn around the clinically apparent lesion.  A fusiform shape was then drawn on the skin incorporating the lesion and margins.  Incisions were then made along these lines to the appropriate tissue plane and the lesion was extirpated.
Complex Repair And Double Advancement Flap Text: The defect edges were debeveled with a #15 scalpel blade.  The primary defect was closed partially with a complex linear closure.  Given the location of the remaining defect, shape of the defect and the proximity to free margins a double advancement flap was deemed most appropriate for complete closure of the defect.  Using a sterile surgical marker, an appropriate advancement flap was drawn incorporating the defect and placing the expected incisions within the relaxed skin tension lines where possible.    The area thus outlined was incised deep to adipose tissue with a #15 scalpel blade.  The skin margins were undermined to an appropriate distance in all directions utilizing iris scissors.
V-Y Flap Text: The defect edges were debeveled with a #15 scalpel blade.  Given the location of the defect, shape of the defect and the proximity to free margins a V-Y flap was deemed most appropriate.  Using a sterile surgical marker, an appropriate advancement flap was drawn incorporating the defect and placing the expected incisions within the relaxed skin tension lines where possible.    The area thus outlined was incised deep to adipose tissue with a #15 scalpel blade.  The skin margins were undermined to an appropriate distance in all directions utilizing iris scissors.
Bilobed Transposition Flap Text: The defect edges were debeveled with a #15 scalpel blade.  Given the location of the defect and the proximity to free margins a bilobed transposition flap was deemed most appropriate.  Using a sterile surgical marker, an appropriate bilobe flap drawn around the defect.    The area thus outlined was incised deep to adipose tissue with a #15 scalpel blade.  The skin margins were undermined to an appropriate distance in all directions utilizing iris scissors.
Complex Repair And Dermal Autograft Text: The defect edges were debeveled with a #15 scalpel blade.  The primary defect was closed partially with a complex linear closure.  Given the location of the defect, shape of the defect and the proximity to free margins an dermal autograft was deemed most appropriate to repair the remaining defect.  The graft was trimmed to fit the size of the remaining defect.  The graft was then placed in the primary defect, oriented appropriately, and sutured into place.
Nasal Turnover Hinge Flap Text: The defect edges were debeveled with a #15 scalpel blade.  Given the size, depth, location of the defect and the defect being full thickness a nasal turnover hinge flap was deemed most appropriate.  Using a sterile surgical marker, an appropriate hinge flap was drawn incorporating the defect. The area thus outlined was incised with a #15 scalpel blade. The flap was designed to recreate the nasal mucosal lining and the alar rim. The skin margins were undermined to an appropriate distance in all directions utilizing iris scissors.
Melolabial Interpolation Flap Text: A decision was made to reconstruct the defect utilizing an interpolation axial flap and a staged reconstruction.  A telfa template was made of the defect.  This telfa template was then used to outline the melolabial interpolation flap.  The donor area for the pedicle flap was then injected with anesthesia.  The flap was excised through the skin and subcutaneous tissue down to the layer of the underlying musculature.  The pedicle flap was carefully excised within this deep plane to maintain its blood supply.  The edges of the donor site were undermined.   The donor site was closed in a primary fashion.  The pedicle was then rotated into position and sutured.  Once the tube was sutured into place, adequate blood supply was confirmed with blanching and refill.  The pedicle was then wrapped with xeroform gauze and dressed appropriately with a telfa and gauze bandage to ensure continued blood supply and protect the attached pedicle.
Scalpel Size: 15 blade
Dermal Autograft Text: The defect edges were debeveled with a #15 scalpel blade.  Given the location of the defect, shape of the defect and the proximity to free margins a dermal autograft was deemed most appropriate.  Using a sterile surgical marker, the primary defect shape was transferred to the donor site. The area thus outlined was incised deep to adipose tissue with a #15 scalpel blade.  The harvested graft was then trimmed of adipose and epidermal tissue until only dermis was left.  The skin graft was then placed in the primary defect and oriented appropriately.
Chonodrocutaneous Helical Advancement Flap Text: The defect edges were debeveled with a #15 scalpel blade.  Given the location of the defect and the proximity to free margins a chondrocutaneous helical advancement flap was deemed most appropriate.  Using a sterile surgical marker, the appropriate advancement flap was drawn incorporating the defect and placing the expected incisions within the relaxed skin tension lines where possible.    The area thus outlined was incised deep to adipose tissue with a #15 scalpel blade.  The skin margins were undermined to an appropriate distance in all directions utilizing iris scissors.
Rhomboid Transposition Flap Text: The defect edges were debeveled with a #15 scalpel blade.  Given the location of the defect and the proximity to free margins a rhomboid transposition flap was deemed most appropriate.  Using a sterile surgical marker, an appropriate rhomboid flap was drawn incorporating the defect.    The area thus outlined was incised deep to adipose tissue with a #15 scalpel blade.  The skin margins were undermined to an appropriate distance in all directions utilizing iris scissors.
Epidermal Closure: running
Epidermal Autograft Text: The defect edges were debeveled with a #15 scalpel blade.  Given the location of the defect, shape of the defect and the proximity to free margins an epidermal autograft was deemed most appropriate.  Using a sterile surgical marker, the primary defect shape was transferred to the donor site. The epidermal graft was then harvested.  The skin graft was then placed in the primary defect and oriented appropriately.
Burow's Advancement Flap Text: The defect edges were debeveled with a #15 scalpel blade.  Given the location of the defect and the proximity to free margins a Burow's advancement flap was deemed most appropriate.  Using a sterile surgical marker, the appropriate advancement flap was drawn incorporating the defect and placing the expected incisions within the relaxed skin tension lines where possible.    The area thus outlined was incised deep to adipose tissue with a #15 scalpel blade.  The skin margins were undermined to an appropriate distance in all directions utilizing iris scissors.
Rhombic Flap Text: The defect edges were debeveled with a #15 scalpel blade.  Given the location of the defect and the proximity to free margins a rhombic flap was deemed most appropriate.  Using a sterile surgical marker, an appropriate rhombic flap was drawn incorporating the defect.    The area thus outlined was incised deep to adipose tissue with a #15 scalpel blade.  The skin margins were undermined to an appropriate distance in all directions utilizing iris scissors.
Complex Repair And Double M Plasty Text: The defect edges were debeveled with a #15 scalpel blade.  The primary defect was closed partially with a complex linear closure.  Given the location of the remaining defect, shape of the defect and the proximity to free margins a double M plasty was deemed most appropriate for complete closure of the defect.  Using a sterile surgical marker, an appropriate advancement flap was drawn incorporating the defect and placing the expected incisions within the relaxed skin tension lines where possible.    The area thus outlined was incised deep to adipose tissue with a #15 scalpel blade.  The skin margins were undermined to an appropriate distance in all directions utilizing iris scissors.
Epidermal Closure Graft Donor Site (Optional): simple interrupted
V-Y Plasty Text: The defect edges were debeveled with a #15 scalpel blade.  Given the location of the defect, shape of the defect and the proximity to free margins an V-Y advancement flap was deemed most appropriate.  Using a sterile surgical marker, an appropriate advancement flap was drawn incorporating the defect and placing the expected incisions within the relaxed skin tension lines where possible.    The area thus outlined was incised deep to adipose tissue with a #15 scalpel blade.  The skin margins were undermined to an appropriate distance in all directions utilizing iris scissors.
Number Of Hemigard Strips Per Side: 1
Epidermal Sutures: 4-0 Plain Gut
Billing Type: Third-Party Bill
Double Island Pedicle Flap Text: The defect edges were debeveled with a #15 scalpel blade.  Given the location of the defect, shape of the defect and the proximity to free margins a double island pedicle advancement flap was deemed most appropriate.  Using a sterile surgical marker, an appropriate advancement flap was drawn incorporating the defect, outlining the appropriate donor tissue and placing the expected incisions within the relaxed skin tension lines where possible.    The area thus outlined was incised deep to adipose tissue with a #15 scalpel blade.  The skin margins were undermined to an appropriate distance in all directions around the primary defect and laterally outward around the island pedicle utilizing iris scissors.  There was minimal undermining beneath the pedicle flap.
Split-Thickness Skin Graft Text: The defect edges were debeveled with a #15 scalpel blade.  Given the location of the defect, shape of the defect and the proximity to free margins a split thickness skin graft was deemed most appropriate.  Using a sterile surgical marker, the primary defect shape was transferred to the donor site. The split thickness graft was then harvested.  The skin graft was then placed in the primary defect and oriented appropriately.
Dressing: dry sterile dressing
Medical Necessity Clause: This procedure was medically necessary because the lesion that was treated was:
Complex Repair And Single Advancement Flap Text: The defect edges were debeveled with a #15 scalpel blade.  The primary defect was closed partially with a complex linear closure.  Given the location of the remaining defect, shape of the defect and the proximity to free margins a single advancement flap was deemed most appropriate for complete closure of the defect.  Using a sterile surgical marker, an appropriate advancement flap was drawn incorporating the defect and placing the expected incisions within the relaxed skin tension lines where possible.    The area thus outlined was incised deep to adipose tissue with a #15 scalpel blade.  The skin margins were undermined to an appropriate distance in all directions utilizing iris scissors.
Double O-Z Flap Text: The defect edges were debeveled with a #15 scalpel blade.  Given the location of the defect, shape of the defect and the proximity to free margins a Double O-Z flap was deemed most appropriate.  Using a sterile surgical marker, an appropriate transposition flap was drawn incorporating the defect and placing the expected incisions within the relaxed skin tension lines where possible. The area thus outlined was incised deep to adipose tissue with a #15 scalpel blade.  The skin margins were undermined to an appropriate distance in all directions utilizing iris scissors.
Complex Repair And Epidermal Autograft Text: The defect edges were debeveled with a #15 scalpel blade.  The primary defect was closed partially with a complex linear closure.  Given the location of the defect, shape of the defect and the proximity to free margins an epidermal autograft was deemed most appropriate to repair the remaining defect.  The graft was trimmed to fit the size of the remaining defect.  The graft was then placed in the primary defect, oriented appropriately, and sutured into place.
Bilobed Flap Text: The defect edges were debeveled with a #15 scalpel blade.  Given the location of the defect and the proximity to free margins a bilobe flap was deemed most appropriate.  Using a sterile surgical marker, an appropriate bilobe flap drawn around the defect.    The area thus outlined was incised deep to adipose tissue with a #15 scalpel blade.  The skin margins were undermined to an appropriate distance in all directions utilizing iris scissors.
Interpolation Flap Text: A decision was made to reconstruct the defect utilizing an interpolation axial flap and a staged reconstruction.  A telfa template was made of the defect.  This telfa template was then used to outline the interpolation flap.  The donor area for the pedicle flap was then injected with anesthesia.  The flap was excised through the skin and subcutaneous tissue down to the layer of the underlying musculature.  The interpolation flap was carefully excised within this deep plane to maintain its blood supply.  The edges of the donor site were undermined.   The donor site was closed in a primary fashion.  The pedicle was then rotated into position and sutured.  Once the tube was sutured into place, adequate blood supply was confirmed with blanching and refill.  The pedicle was then wrapped with xeroform gauze and dressed appropriately with a telfa and gauze bandage to ensure continued blood supply and protect the attached pedicle.
Mustarde Flap Text: The defect edges were debeveled with a #15 scalpel blade.  Given the size, depth and location of the defect and the proximity to free margins a Mustarde flap was deemed most appropriate.  Using a sterile surgical marker, an appropriate flap was drawn incorporating the defect. The area thus outlined was incised with a #15 scalpel blade.  The skin margins were undermined to an appropriate distance in all directions utilizing iris scissors.
O-Z Flap Text: The defect edges were debeveled with a #15 scalpel blade.  Given the location of the defect, shape of the defect and the proximity to free margins an O-Z flap was deemed most appropriate.  Using a sterile surgical marker, an appropriate transposition flap was drawn incorporating the defect and placing the expected incisions within the relaxed skin tension lines where possible. The area thus outlined was incised deep to adipose tissue with a #15 scalpel blade.  The skin margins were undermined to an appropriate distance in all directions utilizing iris scissors.
Banner Transposition Flap Text: The defect edges were debeveled with a #15 scalpel blade.  Given the location of the defect and the proximity to free margins a Banner transposition flap was deemed most appropriate.  Using a sterile surgical marker, an appropriate flap drawn around the defect. The area thus outlined was incised deep to adipose tissue with a #15 scalpel blade.  The skin margins were undermined to an appropriate distance in all directions utilizing iris scissors.
Complex Repair And Split-Thickness Skin Graft Text: The defect edges were debeveled with a #15 scalpel blade.  The primary defect was closed partially with a complex linear closure.  Given the location of the defect, shape of the defect and the proximity to free margins a split thickness skin graft was deemed most appropriate to repair the remaining defect.  The graft was trimmed to fit the size of the remaining defect.  The graft was then placed in the primary defect, oriented appropriately, and sutured into place.
Muscle Hinge Flap Text: The defect edges were debeveled with a #15 scalpel blade.  Given the size, depth and location of the defect and the proximity to free margins a muscle hinge flap was deemed most appropriate.  Using a sterile surgical marker, an appropriate hinge flap was drawn incorporating the defect. The area thus outlined was incised with a #15 scalpel blade.  The skin margins were undermined to an appropriate distance in all directions utilizing iris scissors.
Cheek-To-Nose Interpolation Flap Text: A decision was made to reconstruct the defect utilizing an interpolation axial flap and a staged reconstruction.  A telfa template was made of the defect.  This telfa template was then used to outline the Cheek-To-Nose Interpolation flap.  The donor area for the pedicle flap was then injected with anesthesia.  The flap was excised through the skin and subcutaneous tissue down to the layer of the underlying musculature.  The interpolation flap was carefully excised within this deep plane to maintain its blood supply.  The edges of the donor site were undermined.   The donor site was closed in a primary fashion.  The pedicle was then rotated into position and sutured.  Once the tube was sutured into place, adequate blood supply was confirmed with blanching and refill.  The pedicle was then wrapped with xeroform gauze and dressed appropriately with a telfa and gauze bandage to ensure continued blood supply and protect the attached pedicle.
Information: Selecting Yes will display possible errors in your note based on the variables you have selected. This validation is only offered as a suggestion for you. PLEASE NOTE THAT THE VALIDATION TEXT WILL BE REMOVED WHEN YOU FINALIZE YOUR NOTE. IF YOU WANT TO FAX A PRELIMINARY NOTE YOU WILL NEED TO TOGGLE THIS TO 'NO' IF YOU DO NOT WANT IT IN YOUR FAXED NOTE.
Repair Type: Complex
Composite Graft Text: The defect edges were debeveled with a #15 scalpel blade.  Given the location of the defect, shape of the defect, the proximity to free margins and the fact the defect was full thickness a composite graft was deemed most appropriate.  The defect was outline and then transferred to the donor site.  A full thickness graft was then excised from the donor site. The graft was then placed in the primary defect, oriented appropriately and then sutured into place.  The secondary defect was then repaired using a primary closure.
Advancement Flap (Double) Text: The defect edges were debeveled with a #15 scalpel blade.  Given the location of the defect and the proximity to free margins a double advancement flap was deemed most appropriate.  Using a sterile surgical marker, the appropriate advancement flaps were drawn incorporating the defect and placing the expected incisions within the relaxed skin tension lines where possible.    The area thus outlined was incised deep to adipose tissue with a #15 scalpel blade.  The skin margins were undermined to an appropriate distance in all directions utilizing iris scissors.
Path Notes (To The Dermatopathologist): Please check margins.
Melolabial Transposition Flap Text: The defect edges were debeveled with a #15 scalpel blade.  Given the location of the defect and the proximity to free margins a melolabial flap was deemed most appropriate.  Using a sterile surgical marker, an appropriate melolabial transposition flap was drawn incorporating the defect.    The area thus outlined was incised deep to adipose tissue with a #15 scalpel blade.  The skin margins were undermined to an appropriate distance in all directions utilizing iris scissors.
Complex Repair And M Plasty Text: The defect edges were debeveled with a #15 scalpel blade.  The primary defect was closed partially with a complex linear closure.  Given the location of the remaining defect, shape of the defect and the proximity to free margins an M plasty was deemed most appropriate for complete closure of the defect.  Using a sterile surgical marker, an appropriate advancement flap was drawn incorporating the defect and placing the expected incisions within the relaxed skin tension lines where possible.    The area thus outlined was incised deep to adipose tissue with a #15 scalpel blade.  The skin margins were undermined to an appropriate distance in all directions utilizing iris scissors.
Perilesional Excision Additional Text (Leave Blank If You Do Not Want): The margin was drawn around the clinically apparent lesion. Incisions were then made along these lines to the appropriate tissue plane and the lesion was extirpated.
Complex Repair And Bilobe Flap Text: The defect edges were debeveled with a #15 scalpel blade.  The primary defect was closed partially with a complex linear closure.  Given the location of the remaining defect, shape of the defect and the proximity to free margins a bilobe flap was deemed most appropriate for complete closure of the defect.  Using a sterile surgical marker, an appropriate advancement flap was drawn incorporating the defect and placing the expected incisions within the relaxed skin tension lines where possible.    The area thus outlined was incised deep to adipose tissue with a #15 scalpel blade.  The skin margins were undermined to an appropriate distance in all directions utilizing iris scissors.
Length To Time In Minutes Device Was In Place: 10
Peng Advancement Flap Text: The defect edges were debeveled with a #15 scalpel blade.  Given the location of the defect, shape of the defect and the proximity to free margins a Peng advancement flap was deemed most appropriate.  Using a sterile surgical marker, an appropriate advancement flap was drawn incorporating the defect and placing the expected incisions within the relaxed skin tension lines where possible. The area thus outlined was incised deep to adipose tissue with a #15 scalpel blade.  The skin margins were undermined to an appropriate distance in all directions utilizing iris scissors.
Alar Island Pedicle Flap Text: The defect edges were debeveled with a #15 scalpel blade.  Given the location of the defect, shape of the defect and the proximity to the alar rim an island pedicle advancement flap was deemed most appropriate.  Using a sterile surgical marker, an appropriate advancement flap was drawn incorporating the defect, outlining the appropriate donor tissue and placing the expected incisions within the nasal ala running parallel to the alar rim. The area thus outlined was incised with a #15 scalpel blade.  The skin margins were undermined minimally to an appropriate distance in all directions around the primary defect and laterally outward around the island pedicle utilizing iris scissors.  There was minimal undermining beneath the pedicle flap.
Ftsg Text: The defect edges were debeveled with a #15 scalpel blade.  Given the location of the defect, shape of the defect and the proximity to free margins a full thickness skin graft was deemed most appropriate.  Using a sterile surgical marker, the primary defect shape was transferred to the donor site. The area thus outlined was incised deep to adipose tissue with a #15 scalpel blade.  The harvested graft was then trimmed of adipose tissue until only dermis and epidermis was left.  The skin margins of the secondary defect were undermined to an appropriate distance in all directions utilizing iris scissors.  The secondary defect was closed with interrupted buried subcutaneous sutures.  The skin edges were then re-apposed with running  sutures.  The skin graft was then placed in the primary defect and oriented appropriately.
Where Do You Want The Question To Include Opioid Counseling Located?: Case Summary Tab
Hemigard Postcare Instructions: The HEMIGARD strips are to remain completely dry for at least 5-7 days.
Purse String (Simple) Text: Given the location of the defect and the characteristics of the surrounding skin a purse string simple closure was deemed most appropriate.  Undermining was performed circumferentially around the surgical defect.  A purse string suture was then placed and tightened.

## 2023-01-04 ENCOUNTER — APPOINTMENT (RX ONLY)
Dept: URBAN - METROPOLITAN AREA CLINIC 16 | Facility: CLINIC | Age: 48
Setting detail: DERMATOLOGY
End: 2023-01-04

## 2023-01-04 VITALS — WEIGHT: 131 LBS | HEIGHT: 62 IN

## 2023-01-04 DIAGNOSIS — L81.4 OTHER MELANIN HYPERPIGMENTATION: ICD-10-CM

## 2023-01-04 DIAGNOSIS — D18.0 HEMANGIOMA: ICD-10-CM

## 2023-01-04 DIAGNOSIS — L82.1 OTHER SEBORRHEIC KERATOSIS: ICD-10-CM

## 2023-01-04 DIAGNOSIS — L73.8 OTHER SPECIFIED FOLLICULAR DISORDERS: ICD-10-CM | Status: STABLE

## 2023-01-04 DIAGNOSIS — L73.8 OTHER SPECIFIED FOLLICULAR DISORDERS: ICD-10-CM

## 2023-01-04 DIAGNOSIS — Z87.2 PERSONAL HISTORY OF DISEASES OF THE SKIN AND SUBCUTANEOUS TISSUE: ICD-10-CM

## 2023-01-04 DIAGNOSIS — L57.3 POIKILODERMA OF CIVATTE: ICD-10-CM | Status: STABLE

## 2023-01-04 DIAGNOSIS — D22 MELANOCYTIC NEVI: ICD-10-CM

## 2023-01-04 DIAGNOSIS — L57.0 ACTINIC KERATOSIS: ICD-10-CM | Status: WORSENING

## 2023-01-04 PROBLEM — D23.72 OTHER BENIGN NEOPLASM OF SKIN OF LEFT LOWER LIMB, INCLUDING HIP: Status: ACTIVE | Noted: 2023-01-04

## 2023-01-04 PROBLEM — D18.01 HEMANGIOMA OF SKIN AND SUBCUTANEOUS TISSUE: Status: ACTIVE | Noted: 2023-01-04

## 2023-01-04 PROBLEM — D22.0 MELANOCYTIC NEVI OF LIP: Status: ACTIVE | Noted: 2023-01-04

## 2023-01-04 PROBLEM — D22.39 MELANOCYTIC NEVI OF OTHER PARTS OF FACE: Status: ACTIVE | Noted: 2023-01-04

## 2023-01-04 PROBLEM — D22.5 MELANOCYTIC NEVI OF TRUNK: Status: ACTIVE | Noted: 2023-01-04

## 2023-01-04 PROCEDURE — ? TREATMENT REGIMEN

## 2023-01-04 PROCEDURE — ? COUNSELING

## 2023-01-04 PROCEDURE — 17000 DESTRUCT PREMALG LESION: CPT | Mod: 59

## 2023-01-04 PROCEDURE — 11301 SHAVE SKIN LESION 0.6-1.0 CM: CPT

## 2023-01-04 PROCEDURE — ? LIQUID NITROGEN

## 2023-01-04 PROCEDURE — ? SHAVE REMOVAL

## 2023-01-04 PROCEDURE — 99213 OFFICE O/P EST LOW 20 MIN: CPT | Mod: 25

## 2023-01-04 PROCEDURE — ? EDUCATIONAL RESOURCES PROVIDED

## 2023-01-04 ASSESSMENT — LOCATION DETAILED DESCRIPTION DERM
LOCATION DETAILED: SUPERIOR THORACIC SPINE
LOCATION DETAILED: LEFT CENTRAL MALAR CHEEK
LOCATION DETAILED: UPPER STERNUM
LOCATION DETAILED: LEFT INFERIOR LATERAL MALAR CHEEK
LOCATION DETAILED: RIGHT SUPERIOR UPPER BACK
LOCATION DETAILED: LEFT INFERIOR MEDIAL MALAR CHEEK
LOCATION DETAILED: RIGHT MEDIAL MALAR CHEEK
LOCATION DETAILED: RIGHT PROXIMAL CALF
LOCATION DETAILED: RIGHT SUPERIOR LATERAL UPPER BACK
LOCATION DETAILED: LEFT POSTERIOR SHOULDER
LOCATION DETAILED: LEFT UPPER CUTANEOUS LIP
LOCATION DETAILED: RIGHT RADIAL DORSAL HAND
LOCATION DETAILED: RIGHT SUPERIOR MEDIAL UPPER BACK

## 2023-01-04 ASSESSMENT — LOCATION SIMPLE DESCRIPTION DERM
LOCATION SIMPLE: UPPER BACK
LOCATION SIMPLE: CHEST
LOCATION SIMPLE: LEFT LIP
LOCATION SIMPLE: RIGHT CALF
LOCATION SIMPLE: LEFT SHOULDER
LOCATION SIMPLE: RIGHT HAND
LOCATION SIMPLE: LEFT CHEEK
LOCATION SIMPLE: RIGHT UPPER BACK
LOCATION SIMPLE: RIGHT BACK
LOCATION SIMPLE: RIGHT CHEEK

## 2023-01-04 ASSESSMENT — LOCATION ZONE DERM
LOCATION ZONE: FACE
LOCATION ZONE: ARM
LOCATION ZONE: LIP
LOCATION ZONE: HAND
LOCATION ZONE: TRUNK
LOCATION ZONE: LEG

## 2023-01-04 NOTE — PROCEDURE: LIQUID NITROGEN
Show Applicator Variable?: Yes
Render Post-Care Instructions In Note?: no
Duration Of Freeze Thaw-Cycle (Seconds): 5
Application Tool (Optional): Liquid Nitrogen Sprayer
Consent: The patient's consent was obtained including but not limited to risks of crusting, scabbing, blistering, scarring, darker or lighter pigmentary change, recurrence, incomplete removal and infection.
Post-Care Instructions: I reviewed with the patient in detail post-care instructions. Patient is to wear sunprotection, and avoid picking at any of the treated lesions. Pt may apply Vaseline to crusted or scabbing areas.
Detail Level: Detailed

## 2023-01-04 NOTE — PROCEDURE: SHAVE REMOVAL
Depth Of Shave: dermis
Add Variable For Additional Medical Justification: No
Medical Necessity Clause: This procedure was medically necessary because the lesion that was treated was:
Biopsy Method: Dermablade
Billing Type: Third-Party Bill
Was A Bandage Applied: Yes
X Size Of Lesion In Cm (Optional): 0
Size Of Lesion In Cm (Required): 0.8
Anesthesia Volume In Cc: 0.5
Hemostasis: Electrocautery
Medical Necessity Information: It is in your best interest to select a reason for this procedure from the list below. All of these items fulfill various CMS LCD requirements except the new and changing color options.
Notification Instructions: Patient will be notified of pathology results. However, patient instructed to call the office if not contacted within 2 weeks.
Post-Care Instructions: I reviewed with the patient in detail post-care instructions. Patient is to keep the biopsy site dry overnight, and then apply bacitracin twice daily until healed. Patient may apply hydrogen peroxide soaks to remove any crusting.
Consent was obtained from the patient. The risks and benefits to therapy were discussed in detail. Specifically, the risks of infection, scarring, bleeding, prolonged wound healing, incomplete removal, allergy to anesthesia, nerve injury and recurrence were addressed. Prior to the procedure, the treatment site was clearly identified and confirmed by the patient. All components of Universal Protocol/PAUSE Rule completed.
Detail Level: Detailed
Anesthesia Type: 1% lidocaine with epinephrine
Wound Care: Petrolatum

## 2023-01-04 NOTE — PROCEDURE: MIPS QUALITY
Quality 128: Preventive Care And Screening: Body Mass Index (Bmi) Screening And Follow-Up Plan: BMI is documented within normal parameters and no follow-up plan is required.
Quality 111:Pneumonia Vaccination Status For Older Adults: Pneumococcal Vaccination not Administered or Previously Received, Reason not Otherwise Specified
Detail Level: Detailed
Quality 265: Biopsy Follow-Up: Biopsy results reviewed, communicated, tracked, and documented
Quality 110: Preventive Care And Screening: Influenza Immunization: Influenza immunization was not ordered or administered, reason not given
Quality 130: Documentation Of Current Medications In The Medical Record: Current Medications Documented
Quality 431: Preventive Care And Screening: Unhealthy Alcohol Use - Screening: Patient not identified as an unhealthy alcohol user when screened for unhealthy alcohol use using a systematic screening method
Quality 402: Tobacco Use And Help With Quitting Among Adolescents: Patient screened for tobacco and never smoked

## 2023-07-10 ENCOUNTER — APPOINTMENT (RX ONLY)
Dept: URBAN - METROPOLITAN AREA CLINIC 16 | Facility: CLINIC | Age: 48
Setting detail: DERMATOLOGY
End: 2023-07-10

## 2023-07-10 VITALS — HEIGHT: 61 IN | WEIGHT: 131 LBS

## 2023-07-10 DIAGNOSIS — D18.0 HEMANGIOMA: ICD-10-CM | Status: STABLE

## 2023-07-10 DIAGNOSIS — D22 MELANOCYTIC NEVI: ICD-10-CM | Status: STABLE

## 2023-07-10 DIAGNOSIS — L91.0 HYPERTROPHIC SCAR: ICD-10-CM | Status: WORSENING

## 2023-07-10 DIAGNOSIS — L81.4 OTHER MELANIN HYPERPIGMENTATION: ICD-10-CM | Status: STABLE

## 2023-07-10 DIAGNOSIS — L72.0 EPIDERMAL CYST: ICD-10-CM | Status: WORSENING

## 2023-07-10 DIAGNOSIS — L81.1 CHLOASMA: ICD-10-CM | Status: STABLE

## 2023-07-10 DIAGNOSIS — Z87.2 PERSONAL HISTORY OF DISEASES OF THE SKIN AND SUBCUTANEOUS TISSUE: ICD-10-CM

## 2023-07-10 PROBLEM — D22.72 MELANOCYTIC NEVI OF LEFT LOWER LIMB, INCLUDING HIP: Status: ACTIVE | Noted: 2023-07-10

## 2023-07-10 PROBLEM — D18.01 HEMANGIOMA OF SKIN AND SUBCUTANEOUS TISSUE: Status: ACTIVE | Noted: 2023-07-10

## 2023-07-10 PROBLEM — D23.72 OTHER BENIGN NEOPLASM OF SKIN OF LEFT LOWER LIMB, INCLUDING HIP: Status: ACTIVE | Noted: 2023-07-10

## 2023-07-10 PROBLEM — D22.5 MELANOCYTIC NEVI OF TRUNK: Status: ACTIVE | Noted: 2023-07-10

## 2023-07-10 PROBLEM — D22.71 MELANOCYTIC NEVI OF RIGHT LOWER LIMB, INCLUDING HIP: Status: ACTIVE | Noted: 2023-07-10

## 2023-07-10 PROCEDURE — ? SUNSCREEN RECOMMENDATIONS

## 2023-07-10 PROCEDURE — ? PRESCRIPTION MEDICATION MANAGEMENT

## 2023-07-10 PROCEDURE — 99214 OFFICE O/P EST MOD 30 MIN: CPT

## 2023-07-10 PROCEDURE — ? PRESCRIPTION

## 2023-07-10 PROCEDURE — ? TREATMENT REGIMEN

## 2023-07-10 PROCEDURE — ? COUNSELING

## 2023-07-10 RX ORDER — TRETIONIN 0.25 MG/G
GEL TOPICAL
Qty: 45 | Refills: 11 | Status: ERX | COMMUNITY
Start: 2023-07-10

## 2023-07-10 RX ADMIN — TRETIONIN: 0.25 GEL TOPICAL at 00:00

## 2023-07-10 ASSESSMENT — LOCATION DETAILED DESCRIPTION DERM
LOCATION DETAILED: EPIGASTRIC SKIN
LOCATION DETAILED: RIGHT LOWER CUTANEOUS LIP
LOCATION DETAILED: RIGHT MEDIAL UPPER BACK
LOCATION DETAILED: LEFT MEDIAL SUPERIOR CHEST
LOCATION DETAILED: RIGHT POSTERIOR SHOULDER
LOCATION DETAILED: RIGHT ANTERIOR PROXIMAL THIGH
LOCATION DETAILED: LEFT MEDIAL FOREHEAD
LOCATION DETAILED: LEFT POSTERIOR SHOULDER
LOCATION DETAILED: RIGHT PROXIMAL CALF
LOCATION DETAILED: LEFT ANTERIOR DISTAL THIGH
LOCATION DETAILED: RIGHT SUPERIOR MEDIAL UPPER BACK

## 2023-07-10 ASSESSMENT — LOCATION ZONE DERM
LOCATION ZONE: TRUNK
LOCATION ZONE: ARM
LOCATION ZONE: TRUNK
LOCATION ZONE: LIP
LOCATION ZONE: LEG
LOCATION ZONE: FACE
LOCATION ZONE: LEG

## 2023-07-10 ASSESSMENT — LOCATION SIMPLE DESCRIPTION DERM
LOCATION SIMPLE: LEFT FOREHEAD
LOCATION SIMPLE: RIGHT THIGH
LOCATION SIMPLE: LEFT SHOULDER
LOCATION SIMPLE: RIGHT LIP
LOCATION SIMPLE: CHEST
LOCATION SIMPLE: ABDOMEN
LOCATION SIMPLE: LEFT THIGH
LOCATION SIMPLE: RIGHT SHOULDER
LOCATION SIMPLE: RIGHT CALF
LOCATION SIMPLE: RIGHT UPPER BACK

## 2023-07-10 NOTE — PROCEDURE: SUNSCREEN RECOMMENDATIONS

## 2023-07-10 NOTE — PROCEDURE: MIPS QUALITY
Quality 265: Biopsy Follow-Up: Biopsy results reviewed, communicated, tracked, and documented
Quality 431: Preventive Care And Screening: Unhealthy Alcohol Use - Screening: Patient not identified as an unhealthy alcohol user when screened for unhealthy alcohol use using a systematic screening method
Quality 130: Documentation Of Current Medications In The Medical Record: Current Medications Documented
Quality 128: Preventive Care And Screening: Body Mass Index (Bmi) Screening And Follow-Up Plan: BMI is documented within normal parameters and no follow-up plan is required.
Quality 111:Pneumonia Vaccination Status For Older Adults: Pneumococcal Vaccination not Administered or Previously Received, Reason not Otherwise Specified
Quality 110: Preventive Care And Screening: Influenza Immunization: Influenza immunization was not ordered or administered, reason not given
Quality 402: Tobacco Use And Help With Quitting Among Adolescents: Patient screened for tobacco and never smoked
Detail Level: Detailed

## 2023-07-10 NOTE — PROCEDURE: COUNSELING
Detail Level: Zone
Detail Level: Detailed
Patient Specific Counseling (Will Not Stick From Patient To Patient): Tretinoin may help some with melisma as well.
Sunscreen Recommendations: Mineral based sunscreen
Patient Specific Counseling (Will Not Stick From Patient To Patient): Wear mineral base sunscreen

## 2023-07-10 NOTE — PROCEDURE: PRESCRIPTION MEDICATION MANAGEMENT
Render In Strict Bullet Format?: No
Detail Level: Zone
Initiate Treatment: Tretinoin start q third, work up to every other night, due to pink in complexion, pt. Verb understanding.  Must wear sunscreen, easily burn, verb understanding
verbal cues/1 person assist

## 2024-01-03 ENCOUNTER — APPOINTMENT (RX ONLY)
Dept: URBAN - METROPOLITAN AREA CLINIC 16 | Facility: CLINIC | Age: 49
Setting detail: DERMATOLOGY
End: 2024-01-03

## 2024-01-03 VITALS — WEIGHT: 131 LBS | HEIGHT: 61 IN

## 2024-01-03 DIAGNOSIS — L57.3 POIKILODERMA OF CIVATTE: ICD-10-CM | Status: STABLE

## 2024-01-03 DIAGNOSIS — L81.4 OTHER MELANIN HYPERPIGMENTATION: ICD-10-CM

## 2024-01-03 DIAGNOSIS — D18.0 HEMANGIOMA: ICD-10-CM

## 2024-01-03 DIAGNOSIS — Z87.2 PERSONAL HISTORY OF DISEASES OF THE SKIN AND SUBCUTANEOUS TISSUE: ICD-10-CM

## 2024-01-03 DIAGNOSIS — D485 NEOPLASM OF UNCERTAIN BEHAVIOR OF SKIN: ICD-10-CM | Status: WORSENING

## 2024-01-03 DIAGNOSIS — D22 MELANOCYTIC NEVI: ICD-10-CM

## 2024-01-03 DIAGNOSIS — L73.8 OTHER SPECIFIED FOLLICULAR DISORDERS: ICD-10-CM | Status: STABLE

## 2024-01-03 PROBLEM — D23.72 OTHER BENIGN NEOPLASM OF SKIN OF LEFT LOWER LIMB, INCLUDING HIP: Status: ACTIVE | Noted: 2024-01-03

## 2024-01-03 PROBLEM — D18.01 HEMANGIOMA OF SKIN AND SUBCUTANEOUS TISSUE: Status: ACTIVE | Noted: 2024-01-03

## 2024-01-03 PROBLEM — D48.5 NEOPLASM OF UNCERTAIN BEHAVIOR OF SKIN: Status: ACTIVE | Noted: 2024-01-03

## 2024-01-03 PROBLEM — D22.72 MELANOCYTIC NEVI OF LEFT LOWER LIMB, INCLUDING HIP: Status: ACTIVE | Noted: 2024-01-03

## 2024-01-03 PROBLEM — D22.5 MELANOCYTIC NEVI OF TRUNK: Status: ACTIVE | Noted: 2024-01-03

## 2024-01-03 PROBLEM — D22.71 MELANOCYTIC NEVI OF RIGHT LOWER LIMB, INCLUDING HIP: Status: ACTIVE | Noted: 2024-01-03

## 2024-01-03 PROCEDURE — 11312 SHAVE SKIN LESION 1.1-2.0 CM: CPT

## 2024-01-03 PROCEDURE — ? COUNSELING

## 2024-01-03 PROCEDURE — ? EDUCATIONAL RESOURCES PROVIDED

## 2024-01-03 PROCEDURE — ? SUNSCREEN RECOMMENDATIONS

## 2024-01-03 PROCEDURE — ? SHAVE REMOVAL

## 2024-01-03 PROCEDURE — 99213 OFFICE O/P EST LOW 20 MIN: CPT | Mod: 25

## 2024-01-03 ASSESSMENT — LOCATION SIMPLE DESCRIPTION DERM
LOCATION SIMPLE: RIGHT SHOULDER
LOCATION SIMPLE: RIGHT CALF
LOCATION SIMPLE: LEFT THIGH
LOCATION SIMPLE: LEFT SHOULDER
LOCATION SIMPLE: LEFT CHEEK
LOCATION SIMPLE: RIGHT THIGH
LOCATION SIMPLE: ABDOMEN
LOCATION SIMPLE: RIGHT UPPER BACK
LOCATION SIMPLE: RIGHT CHEEK
LOCATION SIMPLE: CHEST

## 2024-01-03 ASSESSMENT — LOCATION DETAILED DESCRIPTION DERM
LOCATION DETAILED: RIGHT POSTERIOR SHOULDER
LOCATION DETAILED: LEFT ANTERIOR DISTAL THIGH
LOCATION DETAILED: LEFT POSTERIOR SHOULDER
LOCATION DETAILED: EPIGASTRIC SKIN
LOCATION DETAILED: RIGHT ANTERIOR PROXIMAL THIGH
LOCATION DETAILED: RIGHT LATERAL BUCCAL CHEEK
LOCATION DETAILED: LEFT INFERIOR CENTRAL MALAR CHEEK
LOCATION DETAILED: RIGHT PROXIMAL CALF
LOCATION DETAILED: RIGHT SUPERIOR MEDIAL UPPER BACK
LOCATION DETAILED: LEFT LATERAL BUCCAL CHEEK
LOCATION DETAILED: LEFT MEDIAL SUPERIOR CHEST
LOCATION DETAILED: RIGHT MEDIAL UPPER BACK

## 2024-01-03 ASSESSMENT — LOCATION ZONE DERM
LOCATION ZONE: TRUNK
LOCATION ZONE: TRUNK
LOCATION ZONE: LEG
LOCATION ZONE: ARM
LOCATION ZONE: FACE
LOCATION ZONE: LEG

## 2024-01-03 NOTE — PROCEDURE: SHAVE REMOVAL
Was A Bandage Applied: Yes
Post-Care Instructions: I reviewed with the patient in detail post-care instructions. Patient is to keep the biopsy site dry overnight, and then apply bacitracin twice daily until healed. Patient may apply hydrogen peroxide soaks to remove any crusting.
Medical Necessity Clause: This procedure was medically necessary because the lesion that was treated was:
Biopsy Method: Dermablade
Anesthesia Volume In Cc: 0.5
X Size Of Lesion In Cm (Optional): 0
Hemostasis: Electrocautery
Size Of Lesion In Cm (Required): 1.2
Medical Necessity Information: It is in your best interest to select a reason for this procedure from the list below. All of these items fulfill various CMS LCD requirements except the new and changing color options.
Add Variable For Additional Medical Justification: No
Billing Type: Third-Party Bill
Wound Care: Petrolatum
Consent was obtained from the patient. The risks and benefits to therapy were discussed in detail. Specifically, the risks of infection, scarring, bleeding, prolonged wound healing, incomplete removal, allergy to anesthesia, nerve injury and recurrence were addressed. Prior to the procedure, the treatment site was clearly identified and confirmed by the patient. All components of Universal Protocol/PAUSE Rule completed.
Notification Instructions: Patient will be notified of pathology results. However, patient instructed to call the office if not contacted within 2 weeks.
Anesthesia Type: 1% lidocaine with epinephrine
Depth Of Shave: dermis
Detail Level: Detailed

## 2024-01-03 NOTE — PROCEDURE: MIPS QUALITY
Quality 431: Preventive Care And Screening: Unhealthy Alcohol Use - Screening: Patient not identified as an unhealthy alcohol user when screened for unhealthy alcohol use using a systematic screening method
Quality 402: Tobacco Use And Help With Quitting Among Adolescents: Patient screened for tobacco and never smoked
Detail Level: Detailed
Quality 130: Documentation Of Current Medications In The Medical Record: Current Medications Documented
Quality 128: Preventive Care And Screening: Body Mass Index (Bmi) Screening And Follow-Up Plan: BMI is documented within normal parameters and no follow-up plan is required.
Quality 110: Preventive Care And Screening: Influenza Immunization: Influenza immunization was not ordered or administered, reason not given

## 2024-01-03 NOTE — PROCEDURE: SUNSCREEN RECOMMENDATIONS
Products Recommended: Recommend zinc or titanium dioxide sunscreen
Detail Level: Zone
General Sunscreen Counseling: I recommended a broad spectrum sunscreen with a SPF of 30 or higher.  I explained that SPF 30 sunscreens block approximately 97 percent of the sun's harmful rays.  Sunscreens should be applied at least 15 minutes prior to expected sun exposure and then every 2 hours after that as long as sun exposure continues. If swimming or exercising sunscreen should be reapplied every 45 minutes to an hour after getting wet or sweating.  One ounce, or the equivalent of a shot glass full of sunscreen, is adequate to protect the skin not covered by a bathing suit. I also recommended a lip balm with a sunscreen as well. Sun protective clothing can be used in lieu of sunscreen but must be worn the entire time you are exposed to the sun's rays.

## 2024-07-03 ENCOUNTER — APPOINTMENT (RX ONLY)
Dept: URBAN - METROPOLITAN AREA CLINIC 15 | Facility: CLINIC | Age: 49
Setting detail: DERMATOLOGY
End: 2024-07-03

## 2024-07-03 DIAGNOSIS — L57.8 OTHER SKIN CHANGES DUE TO CHRONIC EXPOSURE TO NONIONIZING RADIATION: ICD-10-CM | Status: INADEQUATELY CONTROLLED

## 2024-07-03 DIAGNOSIS — D18.0 HEMANGIOMA: ICD-10-CM | Status: STABLE

## 2024-07-03 DIAGNOSIS — L23.7 ALLERGIC CONTACT DERMATITIS DUE TO PLANTS, EXCEPT FOOD: ICD-10-CM | Status: RESOLVING

## 2024-07-03 DIAGNOSIS — Z87.2 PERSONAL HISTORY OF DISEASES OF THE SKIN AND SUBCUTANEOUS TISSUE: ICD-10-CM

## 2024-07-03 PROBLEM — D23.72 OTHER BENIGN NEOPLASM OF SKIN OF LEFT LOWER LIMB, INCLUDING HIP: Status: ACTIVE | Noted: 2024-07-03

## 2024-07-03 PROBLEM — D18.01 HEMANGIOMA OF SKIN AND SUBCUTANEOUS TISSUE: Status: ACTIVE | Noted: 2024-07-03

## 2024-07-03 PROCEDURE — ? COUNSELING

## 2024-07-03 PROCEDURE — ? PRESCRIPTION

## 2024-07-03 PROCEDURE — 99214 OFFICE O/P EST MOD 30 MIN: CPT

## 2024-07-03 RX ORDER — TRETIONIN 0.25 MG/G
CREAM TOPICAL
Qty: 45 | Refills: 11 | Status: ERX | COMMUNITY
Start: 2024-07-03

## 2024-07-03 RX ADMIN — TRETIONIN: 0.25 CREAM TOPICAL at 00:00

## 2024-07-03 ASSESSMENT — LOCATION ZONE DERM
LOCATION ZONE: HAND
LOCATION ZONE: LEG
LOCATION ZONE: TRUNK

## 2024-07-03 ASSESSMENT — LOCATION DETAILED DESCRIPTION DERM
LOCATION DETAILED: RIGHT PROXIMAL CALF
LOCATION DETAILED: PERIUMBILICAL SKIN
LOCATION DETAILED: LEFT DORSAL MIDDLE METACARPOPHALANGEAL JOINT

## 2024-07-03 ASSESSMENT — LOCATION SIMPLE DESCRIPTION DERM
LOCATION SIMPLE: RIGHT CALF
LOCATION SIMPLE: LEFT HAND
LOCATION SIMPLE: ABDOMEN

## 2024-07-03 NOTE — HPI: PREVENTATIVE SKIN CHECK
What Is The Reason For Today's Visit?: Full Body Skin Examination
Additional History: Patient request a refill on her tretinoin.

## 2025-01-06 ENCOUNTER — APPOINTMENT (OUTPATIENT)
Dept: URBAN - METROPOLITAN AREA CLINIC 15 | Facility: CLINIC | Age: 50
Setting detail: DERMATOLOGY
End: 2025-01-06

## 2025-01-06 DIAGNOSIS — Z87.2 PERSONAL HISTORY OF DISEASES OF THE SKIN AND SUBCUTANEOUS TISSUE: ICD-10-CM

## 2025-01-06 DIAGNOSIS — D18.0 HEMANGIOMA: ICD-10-CM | Status: STABLE

## 2025-01-06 DIAGNOSIS — D22 MELANOCYTIC NEVI: ICD-10-CM | Status: STABLE

## 2025-01-06 DIAGNOSIS — D485 NEOPLASM OF UNCERTAIN BEHAVIOR OF SKIN: ICD-10-CM | Status: WORSENING

## 2025-01-06 PROBLEM — D22.5 MELANOCYTIC NEVI OF TRUNK: Status: ACTIVE | Noted: 2025-01-06

## 2025-01-06 PROBLEM — D23.72 OTHER BENIGN NEOPLASM OF SKIN OF LEFT LOWER LIMB, INCLUDING HIP: Status: ACTIVE | Noted: 2025-01-06

## 2025-01-06 PROBLEM — D48.5 NEOPLASM OF UNCERTAIN BEHAVIOR OF SKIN: Status: ACTIVE | Noted: 2025-01-06

## 2025-01-06 PROBLEM — D18.01 HEMANGIOMA OF SKIN AND SUBCUTANEOUS TISSUE: Status: ACTIVE | Noted: 2025-01-06

## 2025-01-06 PROCEDURE — ? SHAVE REMOVAL

## 2025-01-06 PROCEDURE — ? COUNSELING

## 2025-01-06 PROCEDURE — 11301 SHAVE SKIN LESION 0.6-1.0 CM: CPT

## 2025-01-06 PROCEDURE — ? EDUCATIONAL RESOURCES PROVIDED

## 2025-01-06 PROCEDURE — 99213 OFFICE O/P EST LOW 20 MIN: CPT | Mod: 25

## 2025-01-06 ASSESSMENT — LOCATION DETAILED DESCRIPTION DERM
LOCATION DETAILED: LEFT PROXIMAL CALF
LOCATION DETAILED: RIGHT SUPERIOR MEDIAL UPPER BACK
LOCATION DETAILED: RIGHT PROXIMAL CALF
LOCATION DETAILED: MIDDLE STERNUM
LOCATION DETAILED: RIGHT MEDIAL UPPER BACK

## 2025-01-06 ASSESSMENT — LOCATION SIMPLE DESCRIPTION DERM
LOCATION SIMPLE: CHEST
LOCATION SIMPLE: RIGHT CALF
LOCATION SIMPLE: LEFT CALF
LOCATION SIMPLE: RIGHT UPPER BACK

## 2025-01-06 ASSESSMENT — LOCATION ZONE DERM
LOCATION ZONE: TRUNK
LOCATION ZONE: LEG

## 2025-01-06 NOTE — PROCEDURE: SHAVE REMOVAL
Medical Necessity Information: It is in your best interest to select a reason for this procedure from the list below. All of these items fulfill various CMS LCD requirements except the new and changing color options.
Medical Necessity Clause: This procedure was medically necessary because the lesion that was treated was:
Lab: [id number]
Lab Facility: [id number]
Detail Level: Detailed
Was A Bandage Applied: Yes
Size Of Lesion In Cm (Required): 0.6
Depth Of Shave: dermis
Biopsy Method: Dermablade
Anesthesia Type: 1% lidocaine without epinephrine and a 1:10 solution of 8.4% sodium bicarbonate
Hemostasis: Electrocautery
Wound Care: Petrolatum
Render Path Notes In Note?: No
Consent was obtained from the patient. The risks and benefits to therapy were discussed in detail. Specifically, the risks of infection, scarring, bleeding, prolonged wound healing, incomplete removal, allergy to anesthesia, nerve injury and recurrence were addressed. Prior to the procedure, the treatment site was clearly identified and confirmed by the patient. All components of Universal Protocol/PAUSE Rule completed.
Post-Care Instructions: I reviewed with the patient in detail post-care instructions. Patient is to keep the biopsy site dry overnight, and then apply bacitracin twice daily until healed. Patient may apply hydrogen peroxide soaks to remove any crusting.
Notification Instructions: Patient will be notified of pathology results. However, patient instructed to call the office if not contacted within 2 weeks.
Billing Type: Third-Party Bill